# Patient Record
Sex: FEMALE | Race: WHITE | HISPANIC OR LATINO | URBAN - METROPOLITAN AREA
[De-identification: names, ages, dates, MRNs, and addresses within clinical notes are randomized per-mention and may not be internally consistent; named-entity substitution may affect disease eponyms.]

---

## 2023-05-26 ENCOUNTER — APPOINTMENT (RX ONLY)
Dept: URBAN - METROPOLITAN AREA CLINIC 45 | Facility: CLINIC | Age: 58
Setting detail: DERMATOLOGY
End: 2023-05-26

## 2023-05-26 DIAGNOSIS — Z41.9 ENCOUNTER FOR PROCEDURE FOR PURPOSES OTHER THAN REMEDYING HEALTH STATE, UNSPECIFIED: ICD-10-CM

## 2023-05-26 DIAGNOSIS — L98.8 OTHER SPECIFIED DISORDERS OF THE SKIN AND SUBCUTANEOUS TISSUE: ICD-10-CM

## 2023-05-26 PROCEDURE — ? PRODUCT LINE (REVISION)

## 2023-05-26 PROCEDURE — ? BOTOX (U OR CC)

## 2023-05-26 PROCEDURE — ? COSMETIC CONSULTATION: BOTOX

## 2023-05-26 PROCEDURE — ? COSMETIC QUOTE

## 2023-05-26 PROCEDURE — ? COSMETIC CONSULTATION: FILLERS

## 2023-05-26 ASSESSMENT — LOCATION SIMPLE DESCRIPTION DERM
LOCATION SIMPLE: RIGHT CHEEK
LOCATION SIMPLE: INFERIOR FOREHEAD
LOCATION SIMPLE: RIGHT CHEEK
LOCATION SIMPLE: LEFT CHEEK
LOCATION SIMPLE: LEFT CHEEK
LOCATION SIMPLE: GLABELLA

## 2023-05-26 ASSESSMENT — LOCATION DETAILED DESCRIPTION DERM
LOCATION DETAILED: RIGHT SUPERIOR LATERAL MALAR CHEEK
LOCATION DETAILED: LEFT SUPERIOR LATERAL MALAR CHEEK
LOCATION DETAILED: LEFT INFERIOR CENTRAL MALAR CHEEK
LOCATION DETAILED: RIGHT INFERIOR CENTRAL MALAR CHEEK
LOCATION DETAILED: INFERIOR MID FOREHEAD
LOCATION DETAILED: GLABELLA

## 2023-05-26 ASSESSMENT — LOCATION ZONE DERM
LOCATION ZONE: FACE
LOCATION ZONE: FACE

## 2023-05-26 NOTE — PROCEDURE: COSMETIC QUOTE
Face Procedure 10 Percentage Discount: 0
Breast Procedure 7 Free Text Discount (In Dollars- Use Only Numbers And Decimals): 0.00
Face Procedure 4: Add-On IPL laser
Laser 18 Price/Unit (In Dollars- Use Only Numbers And Decimals): 379.00
Injectable 5: Voluma
Laser 4: Laser Hair Removal 1/2 Leg
Laser 14 Price/Unit (In Dollars- Use Only Numbers And Decimals): 299.00
Face Procedure 1: VI Purify with Precision plus peel
Body Procedure 7 Price/Unit (In Dollars- Use Only Numbers And Decimals): 1400.00
Injectable 2: Botox lip flip
Breast Procedure 1 Price/Unit (In Dollars- Use Only Numbers And Decimals): 1100.00
Laser 1: Add on IPL
Laser 11 Price/Unit (In Dollars- Use Only Numbers And Decimals): 99.00
Apply Facility Fee: No
Injectable 9 Price/Unit (In Dollars- Use Only Numbers And Decimals): 700.00
Body Procedure 4 Price/Unit (In Dollars- Use Only Numbers And Decimals): 75.00
Laser 19: Laser Hair Removal Full Back
Body Procedure 1 Price/Unit (In Dollars- Use Only Numbers And Decimals): 550.00
Laser 8 Price/Unit (In Dollars- Use Only Numbers And Decimals): 900.00
Body Procedure 8: Coolpeel lower legs
Laser 15: Laser Hair Removal Full Arms
Breast Procedure 2: CoolPeel spot treatment
Facility Fee Units (Optional): 1
Face Procedure 5 Price/Unit (In Dollars- Use Only Numbers And Decimals): 200.00
Laser 5 Price/Unit (In Dollars- Use Only Numbers And Decimals): 399.00
Body Procedure 5: Viva Around mouth only
Injectable 3 Barnes/Unit (In Dollars- Use Only Numbers And Decimals): 15.00
Laser 12: Laser hair Removal Full Face/ Beard
Body Procedure 5 Price/Unit (In Dollars- Use Only Numbers And Decimals): 350.00
Laser 20: Laser Hair Removal Buttocks
Laser 2: Laser Hair Removal Full Body
Laser 12 Price/Unit (In Dollars- Use Only Numbers And Decimals): 199.00
Face Procedure 5 Percentage Discount: 10
Face Procedure 9 Price/Unit (In Dollars- Use Only Numbers And Decimals): 450.00
Body Procedure 2 Price/Unit (In Dollars- Use Only Numbers And Decimals): 800.00
Laser 17: Laser Hair Removal Abdomen
Laser 9 Price/Unit (In Dollars- Use Only Numbers And Decimals): 200-300
Laser 16: Laser Hair Removal Chest
Body Procedure 9: Scalp PRP
Laser 13: Laser Hair Removal Under Arms
Face Procedure 6 Price/Unit (In Dollars- Use Only Numbers And Decimals): 200.300
Face Procedure 10: SkinPen micronedling
Body Procedure 6: CoolPeel
Laser 10: Laser hair Removal [place]
Face Procedure 7: Add on V-Beam veins
Injectable 8: Lyft
Laser 3 Price/Unit (In Dollars- Use Only Numbers And Decimals): 675.00
Body Procedure 3: Viva abdominal Scar
Laser 7: Laser Hair Removal Beard Outline
Body Procedure 7: Sadie Solo
Laser 14: Laser Hair Removal 1/2 Arms
Include Sales Tax On Surgeon's Fees: Yes
Breast Procedure 1: Arms and Hands CoolPeel
Face Procedure 4 Price/Unit (In Dollars- Use Only Numbers And Decimals): 250.00
Body Procedure 4: Add on extractions
Laser 4 Price/Unit (In Dollars- Use Only Numbers And Decimals): 475.00
Laser 11: Laser Hair Removal Lip/Chin
Face Procedure 8: Hydrafacial Deluxe
Face Procedure 1 Price/Unit (In Dollars- Use Only Numbers And Decimals): 429.00
Injectable 2 Barnes/Unit (In Dollars- Use Only Numbers And Decimals): 50.00
Injectable 9: Kybella
Laser 19 Price/Unit (In Dollars- Use Only Numbers And Decimals): 500.00
Body Procedure 1: Keravive
Laser 8: CoolPeel Full face
Face Procedure 5: Viva Add on Neck
Injectable 6: Juvederm Ultra XC (lips)
Body Procedure 8 Price/Unit (In Dollars- Use Only Numbers And Decimals): 1200
Injectable 3: Botox (DOA)
Laser 5: Laser Hair Removal Brazilian
Laser 15 Price/Unit (In Dollars- Use Only Numbers And Decimals): 499.00
Face Procedure 5 Units: 5
Face Procedure 2: coolpeel laser full face
Face Procedure 9: IPL Treatment
Injectable 10: Tear Trough Filler
Detail Level: Zone
Laser 2 Price/Unit (In Dollars- Use Only Numbers And Decimals): 1000.00
Injectable 7: Lip filler
Body Procedure 2: Viva with IPL
Laser 9: Cherry Angeomas
Face Procedure 6: V-Beam Laser
Misc Procedure 1: Add-on Milia Extractions
Injectable 4: Facial Fillers (Defyne)
Laser 6: Laser Hair Removal Bikini
Laser 3: Laser Hair Removal Full Legs
Face Procedure 3: Viva Full Face
Injectable 1: Botox (eyes)
Body Procedure 10: SkinPen Microneedling
Face Procedure 7 Price/Unit (In Dollars- Use Only Numbers And Decimals): 100.00
Laser 18: Laser Hair Removal 1/2 Back

## 2023-05-26 NOTE — HPI: COSMETIC CONSULTATION
When Outside In The Sun, Do You...: always burns, never tans
Additional History: New patient scheduled with Victor M Farooq PA-C. Patient has had Botox and fillers about a year ago.

## 2023-05-26 NOTE — PROCEDURE: PRODUCT LINE (REVISION)
Product 34 Application Directions: Apply a dime size to entire face 3 to 4 times per week
Product 50 Price (In Dollars - Numeric Only, No Special Characters Or $): 52.00
Product 6 Application Directions: Apply every night after cleansing, including eye lids.
Product 12 Units: 0
Product 37 Application Directions: Apply every morning before lotions
Product 40 Application Directions: This is your anti-aging, tinted moisturizer for day-time use. Contains SPF of 45
Product 1 Application Directions: Apply daily in the morning
Product 18 Price (In Dollars - Numeric Only, No Special Characters Or $): 58.00
Product 9 Units: 1
Name Of Product 2: Alysa Lagunasuphysical
Name Of Product 27: Intellishade Matte
Product 12 Application Directions: Take suppliment daily
Name Of Product 4: Gilma
Product 1 Price (In Dollars - Numeric Only, No Special Characters Or $): 164.00
Name Of Product 35: 5/2 Pads
Product 24 Price (In Dollars - Numeric Only, No Special Characters Or $): 92.00
Name Of Product 29: Youthful Lip Complex
Product 43 Application Directions: No HQ
Product 9 Application Directions: Use daily at night after cleansing (Vitamin A)
Product 21 Price (In Dollars - Numeric Only, No Special Characters Or $): 130.00
Name Of Product 32: HA Elixer
Name Of Product 44: HQ 2%
Product 32 Price (In Dollars - Numeric Only, No Special Characters Or $): 105.00
Product 18 Application Directions: Apply daily to hands with a pea size for both, can apply twice per day if desired
Name Of Product 10: Obagi  4% Hydro-Q
Name Of Product 7: Papaya Enz Cleanser
Name Of Product 41: Obagi Hydrate
Product 29 Price (In Dollars - Numeric Only, No Special Characters Or $): 46.00
Product 15 Application Directions: Cleanse face every morning and night. If it starts to dry the skin switch to every other day use.
Name Of Product 38: 10/2 Pads
Product 35 Price (In Dollars - Numeric Only, No Special Characters Or $): 38.00
Product 41 Price (In Dollars - Numeric Only, No Special Characters Or $): 51.50
Name Of Product 16: DEJ Eye Cream
Product 4 Price (In Dollars - Numeric Only, No Special Characters Or $): 90.00
Product 38 Price (In Dollars - Numeric Only, No Special Characters Or $): 41.00
Product 21 Application Directions: Apply daily in the morning, especially on picture days
Product 7 Price (In Dollars - Numeric Only, No Special Characters Or $): 40.00
Name Of Product 47: Antioxident Infused gentle Cleanser
Product 27 Price (In Dollars - Numeric Only, No Special Characters Or $): 80.00
Product 24 Application Directions: gel for sensitive skin. Apply a small amount to face nighly, can start with 3 night per week and work up to every night. Avoid eyes.
Name Of Product 13: Serum
Name Of Product 22: Firming Night Treatment
Product 44 Price (In Dollars - Numeric Only, No Special Characters Or $): 18.00
Product 29 Application Directions: Apply at night for a softening/moisturizing treatment on lips
Detail Level: Zone
Name Of Product 19: Nectifirm
Product 10 Price (In Dollars - Numeric Only, No Special Characters Or $): 103.00
Product 48 Price (In Dollars - Numeric Only, No Special Characters Or $): 34.00
Product 27 Application Directions: Apply every morning as the last step, before makeup. This is your sunscreen
Product 7 Application Directions: Wash face morning and night
Product 16 Price (In Dollars - Numeric Only, No Special Characters Or $): 117.00
Product 4 Application Directions: Apply daily in the morning under eyes for puffiness and dark circles
Product 32 Application Directions: Apply every morning before lotions.
Product 10 Application Directions: Use nightly unitl gone as a spot treatment
Name Of Product 25: Intellishade Clear
Name Of Product 5: Vitamin k
Allow Plan To Count Towards E/M Coding: Yes
Product 19 Price (In Dollars - Numeric Only, No Special Characters Or $): 94.00
Name Of Product 30: April BP Cleanser
Product 22 Price (In Dollars - Numeric Only, No Special Characters Or $): 78.00
Name Of Product 36: Finishing Touch
Product 16 Application Directions: Apply morning and night all over eyes
Name Of Product 42: Obagi Acne Cleansing Wipes
Assigning Risk Information: Per AMA, level of risk is based upon consequences of the problem(s) addressed at the encounter when appropriately treated. Risk also includes medical decision making related to the need to initiate or forego further testing, treatment and/or hospitalization. Over the counter medication are assigned a risk level of low. Prescription medication management is assigned a risk level of moderate.
Name Of Product 39: Hair Bundle including Shampoo/conditioner/vitamins/serum
Name Of Product 33: Pumpkin Mask
Name Of Product 8: Obagi Hydro Drops
Name Of Product 28: Gentle Foaming Cleanser
Product 2 Application Directions: Apply last in regimen daily in the morning
Product 8 Price (In Dollars - Numeric Only, No Special Characters Or $): 98.00
Product 22 Application Directions: Apply every night
Name Of Product 14: Nu-Cil
Risk Of Complication Category: No MDM
Name Of Product 3: Obagi Vitamin C with 4% hydro-q
Product 36 Price (In Dollars - Numeric Only, No Special Characters Or $): 50.00
Name Of Product 48: Obaig Exfoliating Cleanser
Product 30 Price (In Dollars - Numeric Only, No Special Characters Or $): 31.00
Product 5 Price (In Dollars - Numeric Only, No Special Characters Or $): 51.00
Name Of Product 11: Voumizing / Hydrating Shampoo and Conditioner
Name Of Product 45: Color Science Sunforgettable Brush
Product 11 Price (In Dollars - Numeric Only, No Special Characters Or $): 56.00
Name Of Product 17: DEJ Face Cream
Product 28 Price (In Dollars - Numeric Only, No Special Characters Or $): 44.00
Product 42 Price (In Dollars - Numeric Only, No Special Characters Or $): 25.00
Product 14 Price (In Dollars - Numeric Only, No Special Characters Or $): 120.00
Product 25 Application Directions: Apply a pea size of product to your face every morning as your last step before makeup.
Product 17 Price (In Dollars - Numeric Only, No Special Characters Or $): 160.00
Name Of Product 20: Nectifirm Advanced
Name Of Product 49: Obagi Elastiderm Eye
Product 5 Application Directions: Apply every morning after Vitamin C, before moisturizer or sunscreen
Name Of Product 23: DEJ Boosting Serum
Name Of Product 31: REssentials Blemish rescue Serum
Product 23 Price (In Dollars - Numeric Only, No Special Characters Or $): 225.00
Product 42 Application Directions: S
Product 3 Price (In Dollars - Numeric Only, No Special Characters Or $): 139.00
Product 36 Application Directions: Apply to dry skin once per week, add a small amount of water in a circular motion across entire face to exfoliate
Product 33 Application Directions: Apply once per week, rinse thoroughly.
Product 8 Application Directions: Use daily in the morning for Hydration on face, neck, chest and hands.
Product 20 Price (In Dollars - Numeric Only, No Special Characters Or $): 142.00
Product 49 Price (In Dollars - Numeric Only, No Special Characters Or $): 115.00
Name Of Product 34: Pore Mejia Mask
Name Of Product 37: Super Charged C Serum
Name Of Product 6: Revox line relaxer
Name Of Product 40: Intellishade Original
Name Of Product 26: REssentials Brightening Pads
Name Of Product 12: ProPil Vitamins
Product 26 Price (In Dollars - Numeric Only, No Special Characters Or $): 87.00
Name Of Product 46: Elta MD Daily or Facial Sunscreen
Product 23 Application Directions: Apply daily morning and night, or morning only if using DEJ night face cream
Product 20 Application Directions: Apply all over neck morning and/or night\\nUnder eye fat pads
Name Of Product 43: Brightening Pads
Product 17 Application Directions: Apply daily in the morning after serums before sunscreen
Product 14 Application Directions: Eyelash serum, apply first tube daily and apply second tube every 3 days to maintain
Name Of Product 9: DEJ Face Night Cream
Product 9 Price (In Dollars - Numeric Only, No Special Characters Or $): 168.00
Name Of Product 18: Lumiquin
Product 6 Price (In Dollars - Numeric Only, No Special Characters Or $): 145.00
Product 3 Application Directions: Use daily in the morning (swap with Revision Vitamin C once desired results are achieved)
Name Of Product 15: Brightening facial Wash
Name Of Product 24: Tretinoin Gel
Product 28 Application Directions: Use morning and night to cleanse face
Product 31 Application Directions: Apply all over or as a spot treatment after cleansing. Use once daily or as needed for acne or folliculitis
Name Of Product 1: C+ Correcting complex 30% vitamin C
Name Of Product 21: Revox 7